# Patient Record
Sex: FEMALE | Race: WHITE | NOT HISPANIC OR LATINO | Employment: OTHER | URBAN - METROPOLITAN AREA
[De-identification: names, ages, dates, MRNs, and addresses within clinical notes are randomized per-mention and may not be internally consistent; named-entity substitution may affect disease eponyms.]

---

## 2017-06-16 ENCOUNTER — OFFICE VISIT (OUTPATIENT)
Dept: CARDIOLOGY | Facility: CLINIC | Age: 67
End: 2017-06-16

## 2017-06-16 VITALS
HEART RATE: 81 BPM | HEIGHT: 63 IN | OXYGEN SATURATION: 98 % | BODY MASS INDEX: 28.88 KG/M2 | SYSTOLIC BLOOD PRESSURE: 148 MMHG | WEIGHT: 163 LBS | DIASTOLIC BLOOD PRESSURE: 74 MMHG

## 2017-06-16 DIAGNOSIS — I10 ESSENTIAL HYPERTENSION: ICD-10-CM

## 2017-06-16 DIAGNOSIS — I25.10 CORONARY ARTERY DISEASE INVOLVING NATIVE CORONARY ARTERY OF NATIVE HEART WITHOUT ANGINA PECTORIS: Primary | ICD-10-CM

## 2017-06-16 DIAGNOSIS — E78.2 MIXED HYPERLIPIDEMIA: ICD-10-CM

## 2017-06-16 PROCEDURE — 99213 OFFICE O/P EST LOW 20 MIN: CPT | Performed by: INTERNAL MEDICINE

## 2017-06-16 PROCEDURE — 93280 PM DEVICE PROGR EVAL DUAL: CPT | Performed by: INTERNAL MEDICINE

## 2017-06-16 RX ORDER — ALLOPURINOL 300 MG/1
300 TABLET ORAL DAILY
COMMUNITY

## 2017-06-16 RX ORDER — PRAVASTATIN SODIUM 80 MG/1
80 TABLET ORAL DAILY
COMMUNITY
End: 2020-07-22

## 2017-06-16 RX ORDER — DONEPEZIL HYDROCHLORIDE 10 MG/1
10 TABLET, FILM COATED ORAL DAILY
COMMUNITY

## 2017-06-16 RX ORDER — SPIRONOLACTONE 100 MG/1
100 TABLET, FILM COATED ORAL DAILY
COMMUNITY

## 2017-06-16 RX ORDER — FUROSEMIDE 20 MG/1
20 TABLET ORAL DAILY
COMMUNITY
End: 2020-07-22

## 2017-09-27 RX ORDER — CARVEDILOL 6.25 MG/1
TABLET ORAL
Qty: 60 TABLET | Refills: 6 | Status: SHIPPED | OUTPATIENT
Start: 2017-09-27 | End: 2020-07-22

## 2018-01-10 ENCOUNTER — OFFICE VISIT (OUTPATIENT)
Dept: CARDIOLOGY | Facility: CLINIC | Age: 68
End: 2018-01-10

## 2018-01-10 VITALS
DIASTOLIC BLOOD PRESSURE: 71 MMHG | HEIGHT: 63 IN | WEIGHT: 154.3 LBS | BODY MASS INDEX: 27.34 KG/M2 | HEART RATE: 81 BPM | SYSTOLIC BLOOD PRESSURE: 136 MMHG

## 2018-01-10 DIAGNOSIS — I44.30 AV BLOCK: Primary | ICD-10-CM

## 2018-01-10 DIAGNOSIS — E78.2 MIXED HYPERLIPIDEMIA: ICD-10-CM

## 2018-01-10 DIAGNOSIS — I10 ESSENTIAL HYPERTENSION: ICD-10-CM

## 2018-01-10 PROCEDURE — 93280 PM DEVICE PROGR EVAL DUAL: CPT | Performed by: INTERNAL MEDICINE

## 2018-01-10 PROCEDURE — 99213 OFFICE O/P EST LOW 20 MIN: CPT | Performed by: INTERNAL MEDICINE

## 2018-01-10 RX ORDER — CHLORAL HYDRATE 500 MG
1000 CAPSULE ORAL
COMMUNITY

## 2018-01-10 NOTE — PROGRESS NOTES
[facility] at [facility]  Office Progress Note  Ana Zeng  1950  [phone]      Visit Date: 06/16/2017    PCP: Rere Carmona, [address]  [facility] [address]    IDENTIFICATION: A 67 y.o. female resident of [place]    Chief Complaint   Patient presents with   • Coronary Artery Disease       PROBLEM LIST:   1. Sick sinus syndrome, status post Biotronik pacemaker implantation (dual-chamber), June 2015.  a. 2015 echo: EF >65%, no significant valvular disease.  b. 5/16 Lexiscan: Fixed anteroapical defect with no evidence of ischemia. Defect conceivably consistent with wall motion abnormal from pacing artifact. EF 35%.  2. Hypertension.  3. Hyperlipidemia.  4. Diabetes mellitus.  Allergies  No Known Allergies    Current Medications    Current Outpatient Prescriptions:   •  allopurinol (ZYLOPRIM) 100 MG tablet, Take 100 mg by mouth Daily., Disp: , Rfl:   •  carvedilol (COREG) 6.25 MG tablet, Take 1 tablet by mouth 2 (Two) Times a Day With Meals., Disp: 60 tablet, Rfl: 6  •  donepezil (ARICEPT) 5 MG tablet, Take 5 mg by mouth Daily., Disp: , Rfl:   •  furosemide (LASIX) 20 MG tablet, Take 20 mg by mouth Daily., Disp: , Rfl:   •  insulin NPH-insulin regular (novoLIN 70/30) (70-30) 100 UNIT/ML injection, Inject  under the skin 2 (Two) Times a Day With Meals. 50 in morning and 35 in the evening, Disp: , Rfl:   •  Lesinurad (ZURAMPIC) 200 MG tablet, Take  by mouth Daily., Disp: , Rfl:   •  lisinopril (PRINIVIL,ZESTRIL) 10 MG tablet, Take 1 tablet by mouth Daily., Disp: 90 tablet, Rfl: 3  •  Omega-3 Fatty Acids (FISH OIL) 1000 MG capsule capsule, Take 1,000 mg by mouth Daily With Breakfast., Disp: , Rfl:   •  pravastatin (PRAVACHOL) 80 MG tablet, Take 80 mg by mouth Daily., Disp: , Rfl:   •  spironolactone (ALDACTONE) 25 MG tablet, Take 25 mg by mouth Daily., Disp: , Rfl:   •  carvedilol (COREG) 6.25 MG tablet, TAKE ONE TABLET BY MOUTH TWICE DAILY, Disp: 60 tablet, Rfl: 6   "    History of Present Illness     Pt denies any chest pain, dyspnea, dyspnea on exertion, orthopnea, PND, palpitations, lower extremity edema, or claudication.    ROS:  All systems have been reviewed and are negative with the exception of those mentioned in the HPI.    OBJECTIVE:  Vitals:    01/10/18 1408   BP: 136/71   BP Location: Right arm   Patient Position: Sitting   Pulse: 81   Weight: 70 kg (154 lb 4.8 oz)   Height: 160 cm (63\")     Physical Exam   Constitutional: She appears well-developed and well-nourished.   Neck: Normal range of motion. Neck supple. No hepatojugular reflux and no JVD present. Carotid bruit is not present. No tracheal deviation present. No thyromegaly present.   Cardiovascular: Normal rate, regular rhythm, S1 normal, S2 normal, intact distal pulses and normal pulses.  PMI is not displaced.  Exam reveals no gallop, no distant heart sounds, no friction rub, no midsystolic click and no opening snap.    No murmur heard.  Pulses:       Radial pulses are 2+ on the right side, and 2+ on the left side.        Dorsalis pedis pulses are 2+ on the right side, and 2+ on the left side.        Posterior tibial pulses are 2+ on the right side, and 2+ on the left side.   Pulmonary/Chest: Effort normal and breath sounds normal. She has no wheezes. She has no rales.   Abdominal: Soft. Bowel sounds are normal. She exhibits no mass. There is no tenderness. There is no guarding.   Musculoskeletal: She exhibits edema.       Diagnostic Data:  Procedures  Biotronik check acceptable threshold impedance complete AV block no mode switch and generator 85% 8 years 10 months    ASSESSMENT:   Diagnosis Plan   1. AV block     2. Essential hypertension     3. Mixed hyperlipidemia         PLAN:  1. Acceptable device check today.  Continue current therapy. Encouraged routine walking regimen.  2. Well controlled on current therapy. Refills will be provided today.  3. Will reinitiate low-dose Lipitor. Recommend PCP to " perform routine yearly lipid panel.    Rere Carmona, DO, thank you for referring Ms. Zeng for evaluation.  I have forwarded my electronically generated recommendations to you for review.  Please do not hesitate to call with any questions.      Clifton Sen MD, FACC

## 2020-07-22 ENCOUNTER — CONSULT (OUTPATIENT)
Dept: CARDIOLOGY | Facility: CLINIC | Age: 70
End: 2020-07-22

## 2020-07-22 VITALS
HEART RATE: 74 BPM | HEIGHT: 63 IN | SYSTOLIC BLOOD PRESSURE: 114 MMHG | WEIGHT: 151 LBS | DIASTOLIC BLOOD PRESSURE: 68 MMHG | BODY MASS INDEX: 26.75 KG/M2

## 2020-07-22 DIAGNOSIS — Z79.4 TYPE 2 DIABETES MELLITUS WITHOUT COMPLICATION, WITH LONG-TERM CURRENT USE OF INSULIN (HCC): ICD-10-CM

## 2020-07-22 DIAGNOSIS — I10 ESSENTIAL HYPERTENSION: ICD-10-CM

## 2020-07-22 DIAGNOSIS — I49.5 SSS (SICK SINUS SYNDROME) (HCC): Primary | ICD-10-CM

## 2020-07-22 DIAGNOSIS — E78.2 MIXED HYPERLIPIDEMIA: ICD-10-CM

## 2020-07-22 DIAGNOSIS — R00.2 PALPITATIONS: ICD-10-CM

## 2020-07-22 DIAGNOSIS — E11.9 TYPE 2 DIABETES MELLITUS WITHOUT COMPLICATION, WITH LONG-TERM CURRENT USE OF INSULIN (HCC): ICD-10-CM

## 2020-07-22 PROCEDURE — 93280 PM DEVICE PROGR EVAL DUAL: CPT | Performed by: INTERNAL MEDICINE

## 2020-07-22 PROCEDURE — 99214 OFFICE O/P EST MOD 30 MIN: CPT | Performed by: INTERNAL MEDICINE

## 2020-07-22 RX ORDER — INSULIN GLARGINE 100 [IU]/ML
45 INJECTION, SOLUTION SUBCUTANEOUS DAILY
COMMUNITY
End: 2020-10-16 | Stop reason: SDUPTHER

## 2020-07-22 RX ORDER — LEVOTHYROXINE SODIUM 0.07 MG/1
75 TABLET ORAL DAILY
COMMUNITY
End: 2021-01-18 | Stop reason: SDUPTHER

## 2020-07-22 RX ORDER — OLANZAPINE 2.5 MG/1
2.5 TABLET ORAL NIGHTLY
COMMUNITY

## 2020-07-22 RX ORDER — OXYBUTYNIN CHLORIDE 10 MG/1
10 TABLET, EXTENDED RELEASE ORAL DAILY
COMMUNITY

## 2020-07-22 RX ORDER — TRAZODONE HYDROCHLORIDE 50 MG/1
50 TABLET ORAL NIGHTLY
COMMUNITY

## 2020-07-22 NOTE — PROGRESS NOTES
[facility] at [facility]  Consultation H&P  Ana Zeng  1950  [address]  [facility] [address]     VISIT DATE:  07/22/20    PCP: Rere Carmona, [address]  [facility] [address]    IDENTIFICATION: A 70 y.o. female resident of [place]  Prior [facility] pt      PROBLEM LIST:   1. Sick sinus syndrome  a. 6/2015 Biotronik pacemaker implantation (dual-chamber), June 2015.  b. 2015 echo: EF >65%, no significant valvular disease.  c. 5/16 Lexiscan: Fixed anteroapical defect with no evidence of ischemia. Defect conceivably consistent with wall motion abnormal from pacing artifact. EF 35%.  2. HTN  3. HLD, on pravastatin  a. 5/21/2020   HDL 39  4. T2DM  5. Gout  6. Osteoarthritis  7. Dementia      CC:  Chief Complaint   Patient presents with   • AV block   • Coronary Artery Disease       Allergies  Allergies   Allergen Reactions   • Ibuprofen Itching       Current Medications    Current Outpatient Medications:   •  allopurinol (ZYLOPRIM) 300 MG tablet, Take 300 mg by mouth Daily., Disp: , Rfl:   •  donepezil (ARICEPT) 10 MG tablet, Take 10 mg by mouth Daily., Disp: , Rfl:   •  insulin glargine (LANTUS) 100 UNIT/ML injection, Inject 35 Units under the skin into the appropriate area as directed Daily., Disp: , Rfl:   •  levothyroxine (SYNTHROID, LEVOTHROID) 75 MCG tablet, Take 75 mcg by mouth Daily., Disp: , Rfl:   •  lisinopril (PRINIVIL,ZESTRIL) 10 MG tablet, Take 1 tablet by mouth Daily., Disp: 90 tablet, Rfl: 3  •  metFORMIN (GLUCOPHAGE) 1000 MG tablet, Take 1,000 mg by mouth Daily With Breakfast., Disp: , Rfl:   •  OLANZapine (zyPREXA) 2.5 MG tablet, Take 2.5 mg by mouth Every Night., Disp: , Rfl:   •  Omega-3 Fatty Acids (FISH OIL) 1000 MG capsule capsule, Take 1,000 mg by mouth Daily With Breakfast., Disp: , Rfl:   •  oxybutynin XL (DITROPAN-XL) 10 MG 24 hr tablet, Take 10 mg by mouth Daily., Disp: , Rfl:   •  spironolactone (ALDACTONE) 100 MG tablet, Take 100  "mg by mouth Daily., Disp: , Rfl:   •  traZODone (DESYREL) 50 MG tablet, Take 50 mg by mouth Every Night., Disp: , Rfl:      History of Present Illness   HPI  Ana Zeng is a 70 y.o. year old female with the above mentioned PMH who presents for reevaluation due to syncope.  The patient had previously followed with our clinic and was last seen in January 2018.  She has had some near syncope. This first occurred several weeks ago following a UTI. She was told she was dehydrated. She was admitted in [place] for this and states her pacer check was acceptable. BP has been low/normal.  BGs have been elevated, a1c was 13 recently. She has stable dyspnea on exertion and occasional palpitations. Pt denies any chest pain, dyspnea at rest,  orthopnea, PND,, lower extremity edema, or claudication.      ROS  Review of Systems   Constitution: Positive for malaise/fatigue.   Cardiovascular: Positive for near-syncope, palpitations and syncope.   Respiratory: Positive for shortness of breath.    Neurological: Positive for dizziness and light-headedness.   All other systems reviewed and are negative.      SOCIAL HX  Social History     Socioeconomic History   • Marital status:      Spouse name: Not on file   • Number of children: Not on file   • Years of education: Not on file   • Highest education level: Not on file   Tobacco Use   • Smoking status: Never Smoker   • Smokeless tobacco: Never Used   Substance and Sexual Activity   • Alcohol use: No   • Drug use: No   • Sexual activity: Defer       FAMILY HX  History reviewed. No pertinent family history.    Vitals:    07/22/20 1547   BP: 114/68   BP Location: Left arm   Patient Position: Sitting   Pulse: 74   Weight: 68.5 kg (151 lb)   Height: 160 cm (63\")       PHYSICAL EXAMINATION:  Physical Exam   Constitutional: She is oriented to person, place, and time. She appears well-developed and well-nourished. No distress.   HENT:   Head: Normocephalic and atraumatic.   Right " Ear: External ear normal.   Left Ear: External ear normal.   Nose: Nose normal.   Eyes: Conjunctivae and EOM are normal.   Neck: Neck supple. No hepatojugular reflux and no JVD present. Carotid bruit is not present. No thyromegaly present.   Cardiovascular: Normal rate, regular rhythm, S1 normal, S2 normal, normal heart sounds, intact distal pulses and normal pulses. Exam reveals no gallop, no distant heart sounds and no midsystolic click.   No murmur heard.  Pulses:       Radial pulses are 2+ on the right side, and 2+ on the left side.        Dorsalis pedis pulses are 2+ on the right side, and 2+ on the left side.        Posterior tibial pulses are 2+ on the right side, and 2+ on the left side.   Pulmonary/Chest: Effort normal and breath sounds normal. No respiratory distress. She has no decreased breath sounds. She has no wheezes. She has no rhonchi. She has no rales.   Abdominal: Soft. Bowel sounds are normal. There is no hepatosplenomegaly. There is no tenderness.   Musculoskeletal: Normal range of motion. She exhibits no edema.   Neurological: She is alert and oriented to person, place, and time.   No focal deficits.   Skin: Skin is warm and dry. No erythema.   Psychiatric: She has a normal mood and affect. Thought content normal.   Nursing note and vitals reviewed.      Diagnostic Data:    ECG 12 Lead  Date/Time: 7/31/2020 10:41 AM  Performed by: Clifton Sen MD  Authorized by: Clifton Sen MD   Comparison: compared with previous ECG from 7/5/2015  Similar to previous ECG  Rhythm: paced  Rate: normal  BPM: 74    Clinical impression: abnormal EKG          No results found for: CHLPL, TRIG, HDL, LDLDIRECT  Lab Results   Component Value Date    GLUCOSE 435 (C) 09/03/2015    BUN 17 09/03/2015    CREATININE 0.9 09/03/2015     09/03/2015    K 4.7 09/03/2015     09/03/2015    CO2 28 09/03/2015     No results found for: HGBA1C  Lab Results   Component Value Date    WBC 7.93 09/03/2015    HGB 11.9  09/03/2015    HCT 35.3 09/03/2015     09/03/2015     Biotronik ppm  100% pacing, no events, 5 years longevity    ASSESSMENT:   Diagnosis Plan   1. SSS (sick sinus syndrome) (CMS/[facility])  Adult Transthoracic Echo Complete W/ Cont if Necessary Per Protocol   2. Essential hypertension     3. Mixed hyperlipidemia     4. Type 2 diabetes mellitus without complication, with long-term current use of insulin (CMS/[facility])         PLAN:  1. Syncope sounds vasovagal in nature. Acceptable ppm check today. Will update echocardiogram.  2. Patient has acceptable BP. Continue current medical management. Counseled to regularly check BP at home with goal averaging <130/80.   3. Continue statin therapy.   4. Patient counseled that cardiac risk with diabetes increases with hemaglobin a1c >7. Counseled to avoid starch rich foods such as bread, pasta, potatoes, and sweets, and to avoid drinking sugary beverages.        Scribed for Clifton Sen MD by Stephanie Adame PA-C. 7/22/2020  16:20   Clifton Sen MD, FACC

## 2020-10-16 ENCOUNTER — OFFICE VISIT (OUTPATIENT)
Dept: ENDOCRINOLOGY | Facility: CLINIC | Age: 70
End: 2020-10-16

## 2020-10-16 VITALS
HEART RATE: 89 BPM | HEIGHT: 63 IN | WEIGHT: 150.8 LBS | RESPIRATION RATE: 16 BRPM | TEMPERATURE: 97.3 F | SYSTOLIC BLOOD PRESSURE: 110 MMHG | OXYGEN SATURATION: 97 % | DIASTOLIC BLOOD PRESSURE: 68 MMHG | BODY MASS INDEX: 26.72 KG/M2

## 2020-10-16 DIAGNOSIS — Z79.4 TYPE 2 DIABETES MELLITUS WITHOUT COMPLICATION, WITH LONG-TERM CURRENT USE OF INSULIN (HCC): Primary | ICD-10-CM

## 2020-10-16 DIAGNOSIS — E11.9 TYPE 2 DIABETES MELLITUS WITHOUT COMPLICATION, WITH LONG-TERM CURRENT USE OF INSULIN (HCC): Primary | ICD-10-CM

## 2020-10-16 LAB — HBA1C MFR BLD: 9.8 %

## 2020-10-16 PROCEDURE — 83036 HEMOGLOBIN GLYCOSYLATED A1C: CPT | Performed by: INTERNAL MEDICINE

## 2020-10-16 PROCEDURE — 99213 OFFICE O/P EST LOW 20 MIN: CPT | Performed by: INTERNAL MEDICINE

## 2020-10-16 RX ORDER — BUPROPION HYDROCHLORIDE 150 MG/1
150 TABLET ORAL DAILY
COMMUNITY
Start: 2020-10-05

## 2020-10-16 RX ORDER — INSULIN GLARGINE 100 [IU]/ML
45 INJECTION, SOLUTION SUBCUTANEOUS DAILY
Qty: 2 ML | Refills: 5 | Status: SHIPPED | OUTPATIENT
Start: 2020-10-16 | End: 2020-12-07 | Stop reason: SDUPTHER

## 2020-10-16 NOTE — PROGRESS NOTES
Office Note      Date: 10/16/2020  Patient Name: Ana Zeng  MRN: [id number]  : 1950    Chief Complaint   Patient presents with   • Diabetes       History of Present Illness:   Ana Zeng is a 70 y.o. female who presents for Diabetes type 2. Diagnosed in: . Treated in past with oral agents. Current treatments: insulin- metformin stopped yesterday due to GI side effects . Number of insulin shots per day: 1. Checks blood sugar 2 times a day.  fastings are high..  And about the same later in the day.  Has low blood sugar: no.     Last A1c:  Hemoglobin A1C   Date Value Ref Range Status   10/16/2020 9.8 % Final       Changes in health since last visit: was hospitalized for depression and dementia for a week . Last eye exam last month .    Subjective      Diabetic Complications:  Eyes: No  Kidneys: No  Feet: Yes, describe: has neuropathy and qualifies for therapeutic shoes   Heart: No    Diet and Exercise:  Meals per day: 2  Minutes of exercise per week: 0 mins.    Review of Systems:   Review of Systems   Constitutional: Positive for activity change and fatigue.   Respiratory: Positive for shortness of breath.    Cardiovascular: Positive for leg swelling. Negative for chest pain.   Gastrointestinal: Positive for diarrhea.   Psychiatric/Behavioral: Positive for agitation and behavioral problems.       The following portions of the patient's history were reviewed and updated as appropriate: allergies, current medications, past family history, past medical history, past social history, past surgical history and problem list.    Objective       Labs:    CMP  Lab Results   Component Value Date    GLUCOSE 435 (C) 2015    BUN 17 2015    CREATININE 0.9 2015    K 4.7 2015    CO2 28 2015    CALCIUM 9.4 2015    AST 18 2015    ALT 16 2015        CBC w/DIFF  Lab Results   Component Value Date    WBC 7.93 2015    RBC 4.12 2015    HGB 11.9 2015     HCT 35.3 09/03/2015    MCV 85.7 09/03/2015    MCH 28.9 09/03/2015    MCHC 33.7 09/03/2015     09/03/2015    NEUTRORELPCT 55.6 09/03/2015    LYMPHORELPCT 28.0 09/03/2015    MONORELPCT 7.3 09/03/2015    EOSRELPCT 7.3 (H) 09/03/2015    BASORELPCT 0.8 09/03/2015    NEUTROABS 4.41 09/03/2015    LYMPHSABS 2.22 09/03/2015    MONOSABS 0.58 09/03/2015    EOSABS 0.58 (H) 09/03/2015    BASOSABS 0.06 09/03/2015    NRBC 0.0 06/15/2015       Physical Exam:  Physical Exam  Constitutional:       Appearance: Normal appearance. She is obese.   HENT:      Head: Normocephalic and atraumatic.   Skin:     General: Skin is warm and dry.   Neurological:      Mental Status: Mental status is at baseline. She is disoriented.   Psychiatric:      Comments: De(ressed  Mood with flat affect          Assessment / Plan      Assessment & Plan:  Problem List Items Addressed This Visit        Endocrine    Diabetes mellitus (CMS/[facility]) - Primary    Current Assessment & Plan     .a1c is down fro m>13 to 9.8 wo she is making progress. I agree with stopping metformin but we will have to increase insulin          Relevant Medications    insulin glargine (LANTUS) 100 UNIT/ML injection    Other Relevant Orders    POC Glycosylated Hemoglobin (Hb A1C) (Completed)           Gurmeet Jimenes MD   10/16/2020

## 2020-10-16 NOTE — ASSESSMENT & PLAN NOTE
.a1c is down fro m>13 to 9.8 wo she is making progress. I agree with stopping metformin but we will have to increase insulin

## 2020-10-19 ENCOUNTER — TELEPHONE (OUTPATIENT)
Dept: ENDOCRINOLOGY | Facility: CLINIC | Age: 70
End: 2020-10-19

## 2020-10-19 NOTE — TELEPHONE ENCOUNTER
PT's daughter called in regards toinsulin glargine (LANTUS) 100 UNIT/ML injection.She said that she was originally taking 35 mL, but PCP changed it from 35 to 46mL, she wanted to make sure Jimenes wanted them to increase dosage to 56mL. She ask that someone reach out to her.

## 2020-10-20 ENCOUNTER — HOSPITAL ENCOUNTER (OUTPATIENT)
Dept: CARDIOLOGY | Facility: HOSPITAL | Age: 70
End: 2020-10-20

## 2020-10-21 NOTE — TELEPHONE ENCOUNTER
Called and spoke to Linda, informed of dose increase. She verbalized understanding and had no further questions.

## 2020-11-20 ENCOUNTER — TELEPHONE (OUTPATIENT)
Dept: ENDOCRINOLOGY | Facility: CLINIC | Age: 70
End: 2020-11-20

## 2020-11-20 NOTE — TELEPHONE ENCOUNTER
PATIENT WAS TOLD TO UP HER INSULIN 65 UNITS AT HER LAST APPT WITH DR STEWARD. SHE IS WANTING TO KNOW IF SHE NEEDS TO TAKE HER INSULIN IN THE MORNING BECAUSE HER SUGARS ARE HIGH THEN. PLEASE CONTACT PATIENTS GRANDDAUGHTER [phone].

## 2020-11-22 ENCOUNTER — HOSPITAL ENCOUNTER (OUTPATIENT)
Dept: CARDIOLOGY | Facility: HOSPITAL | Age: 70
Discharge: HOME | End: 2020-11-22
Admitting: PHYSICIAN ASSISTANT

## 2020-11-22 VITALS — HEIGHT: 63 IN | BODY MASS INDEX: 26.58 KG/M2 | WEIGHT: 150 LBS

## 2020-11-22 LAB
BH CV ECHO MEAS - AO MAX PG (FULL): 2.8 MMHG
BH CV ECHO MEAS - AO MAX PG: 6.1 MMHG
BH CV ECHO MEAS - AO ROOT AREA (BSA CORRECTED): 1.6
BH CV ECHO MEAS - AO ROOT AREA: 6.1 CM^2
BH CV ECHO MEAS - AO ROOT DIAM: 2.8 CM
BH CV ECHO MEAS - AO V2 MAX: 123.4 CM/SEC
BH CV ECHO MEAS - ASC AORTA: 3.3 CM
BH CV ECHO MEAS - AVA(V,A): 2.2 CM^2
BH CV ECHO MEAS - AVA(V,D): 2.2 CM^2
BH CV ECHO MEAS - BSA(HAYCOCK): 1.8 M^2
BH CV ECHO MEAS - BSA: 1.7 M^2
BH CV ECHO MEAS - BZI_BMI: 26.6 KILOGRAMS/M^2
BH CV ECHO MEAS - BZI_METRIC_HEIGHT: 160 CM
BH CV ECHO MEAS - BZI_METRIC_WEIGHT: 68 KG
BH CV ECHO MEAS - EDV(CUBED): 83 ML
BH CV ECHO MEAS - EDV(MOD-SP2): 44 ML
BH CV ECHO MEAS - EDV(MOD-SP4): 46 ML
BH CV ECHO MEAS - EDV(TEICH): 85.9 ML
BH CV ECHO MEAS - EF(CUBED): 61.5 %
BH CV ECHO MEAS - EF(MOD-BP): 47 %
BH CV ECHO MEAS - EF(MOD-SP2): 40.9 %
BH CV ECHO MEAS - EF(MOD-SP4): 56.5 %
BH CV ECHO MEAS - EF(TEICH): 53.3 %
BH CV ECHO MEAS - ESV(CUBED): 31.9 ML
BH CV ECHO MEAS - ESV(MOD-SP2): 26 ML
BH CV ECHO MEAS - ESV(MOD-SP4): 20 ML
BH CV ECHO MEAS - ESV(TEICH): 40.1 ML
BH CV ECHO MEAS - FS: 27.3 %
BH CV ECHO MEAS - IVS/LVPW: 0.77
BH CV ECHO MEAS - IVSD: 0.86 CM
BH CV ECHO MEAS - LA DIMENSION: 3.1 CM
BH CV ECHO MEAS - LA/AO: 1.1
BH CV ECHO MEAS - LAD MAJOR: 4.1 CM
BH CV ECHO MEAS - LAT PEAK E' VEL: 5.8 CM/SEC
BH CV ECHO MEAS - LATERAL E/E' RATIO: 22.3
BH CV ECHO MEAS - LV DIASTOLIC VOL/BSA (35-75): 26.9 ML/M^2
BH CV ECHO MEAS - LV MASS(C)D: 143.5 GRAMS
BH CV ECHO MEAS - LV MASS(C)DI: 83.8 GRAMS/M^2
BH CV ECHO MEAS - LV MAX PG: 3.3 MMHG
BH CV ECHO MEAS - LV MEAN PG: 2.1 MMHG
BH CV ECHO MEAS - LV SYSTOLIC VOL/BSA (12-30): 11.7 ML/M^2
BH CV ECHO MEAS - LV V1 MAX: 90.3 CM/SEC
BH CV ECHO MEAS - LV V1 MEAN: 70.2 CM/SEC
BH CV ECHO MEAS - LV V1 VTI: 17 CM
BH CV ECHO MEAS - LVIDD: 4.4 CM
BH CV ECHO MEAS - LVIDS: 3.2 CM
BH CV ECHO MEAS - LVLD AP2: 6.9 CM
BH CV ECHO MEAS - LVLD AP4: 6.9 CM
BH CV ECHO MEAS - LVLS AP2: 6.4 CM
BH CV ECHO MEAS - LVLS AP4: 6 CM
BH CV ECHO MEAS - LVOT AREA (M): 2.8 CM^2
BH CV ECHO MEAS - LVOT AREA: 2.9 CM^2
BH CV ECHO MEAS - LVOT DIAM: 1.9 CM
BH CV ECHO MEAS - LVPWD: 1.1 CM
BH CV ECHO MEAS - MED PEAK E' VEL: 4.9 CM/SEC
BH CV ECHO MEAS - MEDIAL E/E' RATIO: 26.2
BH CV ECHO MEAS - MV DEC TIME: 0.13 SEC
BH CV ECHO MEAS - MV E MAX VEL: 132.7 CM/SEC
BH CV ECHO MEAS - PA MAX PG: 3.8 MMHG
BH CV ECHO MEAS - PA V2 MAX: 97.2 CM/SEC
BH CV ECHO MEAS - SI(CUBED): 29.8 ML/M^2
BH CV ECHO MEAS - SI(LVOT): 29.4 ML/M^2
BH CV ECHO MEAS - SI(MOD-SP2): 10.5 ML/M^2
BH CV ECHO MEAS - SI(MOD-SP4): 15.2 ML/M^2
BH CV ECHO MEAS - SI(TEICH): 26.8 ML/M^2
BH CV ECHO MEAS - SV(CUBED): 51.1 ML
BH CV ECHO MEAS - SV(LVOT): 50.3 ML
BH CV ECHO MEAS - SV(MOD-SP2): 18 ML
BH CV ECHO MEAS - SV(MOD-SP4): 26 ML
BH CV ECHO MEAS - SV(TEICH): 45.8 ML
BH CV ECHO MEAS - TAPSE (>1.6): 1.5 CM
BH CV ECHO MEASUREMENTS AVERAGE E/E' RATIO: 24.8
BH CV VAS BP LEFT ARM: NORMAL MMHG
BH CV XLRA - RV BASE: 2.6 CM
BH CV XLRA - RV LENGTH: 7.8 CM
BH CV XLRA - RV MID: 2.3 CM
BH CV XLRA - TDI S': 8.9 CM/SEC
LEFT ATRIUM VOLUME INDEX: 12.9 ML/M^2
LEFT ATRIUM VOLUME: 22 ML
LV EF 2D ECHO EST: 45 %
MAXIMAL PREDICTED HEART RATE: 148 BPM
STRESS TARGET HR: 126 BPM

## 2020-11-22 PROCEDURE — 93306 TTE W/DOPPLER COMPLETE: CPT

## 2020-11-22 PROCEDURE — 93306 TTE W/DOPPLER COMPLETE: CPT | Performed by: INTERNAL MEDICINE

## 2020-11-23 ENCOUNTER — TELEPHONE (OUTPATIENT)
Dept: CARDIOLOGY | Facility: CLINIC | Age: 70
End: 2020-11-23

## 2020-11-23 NOTE — TELEPHONE ENCOUNTER
Spoke with patient daughter ladarius dvised that per  her recent echo showed mild LV dysfunction with EF of 45% and to be sure we are receiving pacer home checks. Provided patient daughter with device clinic number to make sure home monitoring is working

## 2020-11-23 NOTE — TELEPHONE ENCOUNTER
Spoke with grand daughter sarah to increase lantus to 70 units at bedtime.  She voiced understanding.  Told her to call as needed

## 2020-11-23 NOTE — TELEPHONE ENCOUNTER
Spoke to daughter and monitor has been unplugged. New monitor needs to be ordered.  New monitor ordered and appt made for her to come in and have home monitoring reset.

## 2020-11-23 NOTE — TELEPHONE ENCOUNTER
Spoke to grand daughter. Advised her about taking the lantus at night. She states  her bs's are in the 300-400 range in the am.  You increase her lantus to 65 units at bedtime last visit but her metformin was stopped because of side effects.  Changes?

## 2020-11-30 ENCOUNTER — CLINICAL SUPPORT NO REQUIREMENTS (OUTPATIENT)
Dept: CARDIOLOGY | Facility: CLINIC | Age: 70
End: 2020-11-30

## 2020-11-30 DIAGNOSIS — I49.5 SSS (SICK SINUS SYNDROME) (HCC): Primary | ICD-10-CM

## 2020-12-07 DIAGNOSIS — Z79.4 TYPE 2 DIABETES MELLITUS WITHOUT COMPLICATION, WITH LONG-TERM CURRENT USE OF INSULIN (HCC): ICD-10-CM

## 2020-12-07 DIAGNOSIS — E11.9 TYPE 2 DIABETES MELLITUS WITHOUT COMPLICATION, WITH LONG-TERM CURRENT USE OF INSULIN (HCC): ICD-10-CM

## 2020-12-07 RX ORDER — INSULIN GLARGINE 100 [IU]/ML
45 INJECTION, SOLUTION SUBCUTANEOUS DAILY
Qty: 2 ML | Refills: 5 | Status: SHIPPED | OUTPATIENT
Start: 2020-12-07 | End: 2020-12-08 | Stop reason: SDUPTHER

## 2020-12-07 NOTE — TELEPHONE ENCOUNTER
Patient needs a refill for her insulin (Lantus), and wants to know if they could get a 90 day refill, if possible.

## 2020-12-08 ENCOUNTER — TELEPHONE (OUTPATIENT)
Dept: ENDOCRINOLOGY | Facility: CLINIC | Age: 70
End: 2020-12-08

## 2020-12-08 DIAGNOSIS — Z79.4 TYPE 2 DIABETES MELLITUS WITHOUT COMPLICATION, WITH LONG-TERM CURRENT USE OF INSULIN (HCC): ICD-10-CM

## 2020-12-08 DIAGNOSIS — E11.9 TYPE 2 DIABETES MELLITUS WITHOUT COMPLICATION, WITH LONG-TERM CURRENT USE OF INSULIN (HCC): ICD-10-CM

## 2020-12-08 RX ORDER — INSULIN GLARGINE 100 [IU]/ML
INJECTION, SOLUTION SUBCUTANEOUS
Qty: 70 ML | Refills: 1 | Status: SHIPPED | OUTPATIENT
Start: 2020-12-08

## 2020-12-08 NOTE — TELEPHONE ENCOUNTER
THIS PT'S DAUGHTER CALLED REQUESTING THAT WE SENT A PRESCRIPTION FOR SYNTHROID IN BUT THE PT IS UNABLE TO FILL THE NEW DOSAGE PRESCRIPTION B/C OF SOMETHING TO DO W/ HER OLD MEDICATION/DOSAGE. PLEASE CONSULT PT/PT's DAUGHTER AT EARLIEST CONVENIENCE. THANK YOU

## 2021-01-18 RX ORDER — LEVOTHYROXINE SODIUM 0.07 MG/1
75 TABLET ORAL DAILY
Qty: 30 TABLET | Refills: 0 | Status: SHIPPED | OUTPATIENT
Start: 2021-01-18 | End: 2021-04-19

## 2021-03-16 PROCEDURE — 93294 REM INTERROG EVL PM/LDLS PM: CPT | Performed by: INTERNAL MEDICINE

## 2021-03-16 PROCEDURE — 93296 REM INTERROG EVL PM/IDS: CPT | Performed by: INTERNAL MEDICINE

## 2021-04-05 ENCOUNTER — TELEPHONE (OUTPATIENT)
Dept: ENDOCRINOLOGY | Facility: CLINIC | Age: 71
End: 2021-04-05

## 2021-04-05 DIAGNOSIS — E11.9 TYPE 2 DIABETES MELLITUS WITHOUT COMPLICATION, WITH LONG-TERM CURRENT USE OF INSULIN (HCC): ICD-10-CM

## 2021-04-05 DIAGNOSIS — Z79.4 TYPE 2 DIABETES MELLITUS WITHOUT COMPLICATION, WITH LONG-TERM CURRENT USE OF INSULIN (HCC): ICD-10-CM

## 2021-04-05 NOTE — TELEPHONE ENCOUNTER
Spoke with Linda, patients daughter. We do not have any vials.  She is going to contact patients pharmacy and have them call the office with questions.

## 2021-04-05 NOTE — TELEPHONE ENCOUNTER
Pt daughter called states they tried to refill her Lantus 100 unit/mL Pharmacy states its too soon to fill. Pt is running out before it's time to fill the Prescription. Pt daughter wanted to know if this will happen every time Pt daughter states she is taking 70 units daily. Pt daughter wants to know if we have any samples can not fill until 04/10/21. Please notify pt

## 2021-04-19 RX ORDER — LEVOTHYROXINE SODIUM 0.07 MG/1
75 TABLET ORAL DAILY
Qty: 30 TABLET | Refills: 1 | Status: SHIPPED | OUTPATIENT
Start: 2021-04-19

## 2021-09-03 NOTE — PROGRESS NOTES
[facility] at [facility]  Office Progress Note  Ana Zeng  1950  [phone]      Visit Date: 06/16/2017    PCP: Rere Carmona, [address] [place] [id number]    IDENTIFICATION: A 67 y.o. female resident of [place]    Chief Complaint   Patient presents with   • Follow-up     SSS       PROBLEM LIST:   1. Sick sinus syndrome, status post Biotronik pacemaker implantation (dual-chamber), June 2015.  a. 2015 echo: EF >65%, no significant valvular disease.  b. 5/16 Lexiscan: Fixed anteroapical defect with no evidence of ischemia. Defect conceivably consistent with wall motion abnormal from pacing artifact. EF 35%.  2. Hypertension.  3. Hyperlipidemia.  4. Diabetes mellitus.  Allergies  No Known Allergies    Current Medications    Current Outpatient Prescriptions:   •  allopurinol (ZYLOPRIM) 100 MG tablet, Take 100 mg by mouth Daily., Disp: , Rfl:   •  carvedilol (COREG) 6.25 MG tablet, Take 1 tablet by mouth 2 (Two) Times a Day With Meals., Disp: 60 tablet, Rfl: 6  •  donepezil (ARICEPT) 5 MG tablet, Take 5 mg by mouth Daily., Disp: , Rfl:   •  furosemide (LASIX) 20 MG tablet, Take 20 mg by mouth Daily., Disp: , Rfl:   •  insulin NPH-insulin regular (novoLIN 70/30) (70-30) 100 UNIT/ML injection, Inject  under the skin 2 (Two) Times a Day With Meals., Disp: , Rfl:   •  Lesinurad (ZURAMPIC) 200 MG tablet, Take  by mouth Daily., Disp: , Rfl:   •  lisinopril (PRINIVIL,ZESTRIL) 10 MG tablet, Take 1 tablet by mouth Daily., Disp: 90 tablet, Rfl: 3  •  metFORMIN (GLUCOPHAGE) 1000 MG tablet, Take 1,000 mg by mouth 2 (Two) Times a Day With Meals., Disp: , Rfl:   •  pravastatin (PRAVACHOL) 80 MG tablet, Take 80 mg by mouth Daily., Disp: , Rfl:   •  spironolactone (ALDACTONE) 25 MG tablet, Take 25 mg by mouth Daily., Disp: , Rfl:       History of Present Illness     Pt denies any chest pain, dyspnea, dyspnea on exertion, orthopnea, PND, palpitations, lower extremity edema, or  "claudication.    ROS:  All systems have been reviewed and are negative with the exception of those mentioned in the HPI.    OBJECTIVE:  Vitals:    06/16/17 1520   BP: 148/74   BP Location: Left arm   Patient Position: Sitting   Pulse: 81   SpO2: 98%   Weight: 163 lb (73.9 kg)   Height: 63\" (160 cm)     Physical Exam   Constitutional: She appears well-developed and well-nourished.   Neck: Normal range of motion. Neck supple. No hepatojugular reflux and no JVD present. Carotid bruit is not present. No tracheal deviation present. No thyromegaly present.   Cardiovascular: Normal rate, regular rhythm, S1 normal, S2 normal, intact distal pulses and normal pulses.  PMI is not displaced.  Exam reveals no gallop, no distant heart sounds, no friction rub, no midsystolic click and no opening snap.    No murmur heard.  Pulses:       Radial pulses are 2+ on the right side, and 2+ on the left side.        Dorsalis pedis pulses are 2+ on the right side, and 2+ on the left side.        Posterior tibial pulses are 2+ on the right side, and 2+ on the left side.   Pulmonary/Chest: Effort normal and breath sounds normal. She has no wheezes. She has no rales.   Abdominal: Soft. Bowel sounds are normal. She exhibits no mass. There is no tenderness. There is no guarding.   Musculoskeletal: She exhibits edema.       Diagnostic Data:  Procedures  Biotronik check acceptable threshold impedance no mode switch and generator 85% 8 years 10 months    ASSESSMENT:   Diagnosis Plan   1. Coronary artery disease involving native coronary artery of native heart without angina pectoris     2. Essential hypertension     3. Mixed hyperlipidemia         PLAN:  1. Acceptable device check today.  Continue current therapy. Encouraged routine walking regimen.  2. Well controlled on current therapy. Refills will be provided today.  3. Will reinitiate low-dose Lipitor. Recommend PCP to perform routine yearly lipid panel.    Rere Carmona, DO, thank you " for referring Ms. Zeng for evaluation.  I have forwarded my electronically generated recommendations to you for review.  Please do not hesitate to call with any questions.      Clifton Sen MD, FACC   Note Text (......Xxx Chief Complaint.): This diagnosis correlates with the Render Risk Assessment In Note?: no Detail Level: Detailed Other (Free Text): Evelia explained that cautery is the best treatment option but that the lesion is likely to recur. Patient reports irritation at the site where her bra meets her skin.

## 2021-09-07 DIAGNOSIS — E11.9 TYPE 2 DIABETES MELLITUS WITHOUT COMPLICATION, WITH LONG-TERM CURRENT USE OF INSULIN (HCC): ICD-10-CM

## 2021-09-07 DIAGNOSIS — Z79.4 TYPE 2 DIABETES MELLITUS WITHOUT COMPLICATION, WITH LONG-TERM CURRENT USE OF INSULIN (HCC): ICD-10-CM

## 2021-09-07 RX ORDER — INSULIN GLARGINE 100 [IU]/ML
INJECTION, SOLUTION SUBCUTANEOUS
Qty: 70 ML | Refills: 1 | OUTPATIENT
Start: 2021-09-07

## 2021-09-14 PROCEDURE — 93296 REM INTERROG EVL PM/IDS: CPT | Performed by: INTERNAL MEDICINE

## 2021-09-14 PROCEDURE — 93294 REM INTERROG EVL PM/LDLS PM: CPT | Performed by: INTERNAL MEDICINE

## 2022-03-15 PROCEDURE — 93294 REM INTERROG EVL PM/LDLS PM: CPT | Performed by: INTERNAL MEDICINE

## 2022-03-15 PROCEDURE — 93296 REM INTERROG EVL PM/IDS: CPT | Performed by: INTERNAL MEDICINE

## 2022-05-24 ENCOUNTER — TELEPHONE (OUTPATIENT)
Dept: CARDIOLOGY | Facility: CLINIC | Age: 72
End: 2022-05-24

## 2022-05-24 NOTE — TELEPHONE ENCOUNTER
Pt has not been seen by a provider in this practice since 7/2020. Attempted to Call Anna, per instructions in pt's demographics-see below-call did not ring/message immediately states the wireless caller is unavailable.     Pt has No Showed her last three cardiology appointments.

## 2022-11-07 ENCOUNTER — TELEPHONE (OUTPATIENT)
Dept: ENDOCRINOLOGY | Facility: CLINIC | Age: 72
End: 2022-11-07

## 2022-11-07 DIAGNOSIS — E11.9 TYPE 2 DIABETES MELLITUS WITHOUT COMPLICATION, WITH LONG-TERM CURRENT USE OF INSULIN: Primary | ICD-10-CM

## 2022-11-07 DIAGNOSIS — Z79.4 TYPE 2 DIABETES MELLITUS WITHOUT COMPLICATION, WITH LONG-TERM CURRENT USE OF INSULIN: Primary | ICD-10-CM

## 2022-11-07 NOTE — TELEPHONE ENCOUNTER
[phone] DR CORTES    PATIENT'S DAUGHTER CALLED STATING THAT SHE HAS MOVED PATIENT TO BE CLOSER TO HER AND THAT SHE IS TRYING TO GET THE PATIENT SCHEDULED WITH A NEW ENDOCRINOLOGIST LOCALLY. DAUGHTER IS REQUESTING TO HAVE THIS OFFICE SEND A REFERRAL TO FAX # LISTED ABOVE. DAUGHTER IS WANTING TO EST CARE FOR PATIENT WITH  A DR CORTES.

## 2022-12-13 PROCEDURE — 93296 REM INTERROG EVL PM/IDS: CPT | Performed by: INTERNAL MEDICINE

## 2022-12-13 PROCEDURE — 93294 REM INTERROG EVL PM/LDLS PM: CPT | Performed by: INTERNAL MEDICINE

## 2023-01-13 ENCOUNTER — TELEPHONE (OUTPATIENT)
Dept: CARDIOLOGY | Facility: CLINIC | Age: 73
End: 2023-01-13
Payer: MEDICARE

## 2023-01-13 NOTE — TELEPHONE ENCOUNTER
Voicemail message received from [facility] asking for the device clinic to release pt in the [place] remote home monitoring web site. The patient is now receiving her cardiac care at [facility].    Pt released from [facility] cardiology Biotronik web site.